# Patient Record
Sex: MALE | Race: BLACK OR AFRICAN AMERICAN
[De-identification: names, ages, dates, MRNs, and addresses within clinical notes are randomized per-mention and may not be internally consistent; named-entity substitution may affect disease eponyms.]

---

## 2017-04-20 ENCOUNTER — HOSPITAL ENCOUNTER (EMERGENCY)
Dept: HOSPITAL 10 - E/R | Age: 57
Discharge: LEFT BEFORE DISCHARGE | End: 2017-04-20
Payer: SELF-PAY

## 2017-04-20 VITALS
HEIGHT: 70 IN | HEIGHT: 70 IN | BODY MASS INDEX: 26.51 KG/M2 | WEIGHT: 185.19 LBS | WEIGHT: 185.19 LBS | BODY MASS INDEX: 26.51 KG/M2

## 2017-04-20 DIAGNOSIS — Z53.21: Primary | ICD-10-CM

## 2017-04-27 ENCOUNTER — HOSPITAL ENCOUNTER (INPATIENT)
Dept: HOSPITAL 10 - E/R | Age: 57
LOS: 3 days | Discharge: HOME | DRG: 872 | End: 2017-04-30
Attending: INTERNAL MEDICINE | Admitting: INTERNAL MEDICINE
Payer: MEDICAID

## 2017-04-27 VITALS
BODY MASS INDEX: 28.57 KG/M2 | HEIGHT: 68 IN | WEIGHT: 188.5 LBS | HEIGHT: 68 IN | WEIGHT: 188.5 LBS | BODY MASS INDEX: 28.57 KG/M2

## 2017-04-27 DIAGNOSIS — N45.2: ICD-10-CM

## 2017-04-27 DIAGNOSIS — B96.20: ICD-10-CM

## 2017-04-27 DIAGNOSIS — A41.9: Primary | ICD-10-CM

## 2017-04-27 DIAGNOSIS — K52.9: ICD-10-CM

## 2017-04-27 DIAGNOSIS — N39.0: ICD-10-CM

## 2017-04-27 DIAGNOSIS — B96.1: ICD-10-CM

## 2017-04-27 LAB
ADD SCAN DIFF: NO
ADD UMIC: YES
ALBUMIN SERPL-MCNC: 4 G/DL (ref 3.3–4.9)
ALBUMIN/GLOB SERPL: 1.29 {RATIO}
ALP SERPL-CCNC: 58 IU/L (ref 42–121)
ALT SERPL-CCNC: 37 IU/L (ref 13–69)
ANION GAP SERPL CALC-SCNC: 9 MMOL/L (ref 8–16)
APTT BLD: 24.7 SEC (ref 25–35)
AST SERPL-CCNC: 28 IU/L (ref 15–46)
BACTERIA #/AREA URNS HPF: (no result) /[HPF]
BASOPHILS # BLD AUTO: 0.1 10^3/UL (ref 0–0.1)
BASOPHILS NFR BLD: 0.3 % (ref 0–2)
BILIRUB DIRECT SERPL-MCNC: 0 MG/DL (ref 0–0.2)
BILIRUB SERPL-MCNC: 0.9 MG/DL (ref 0.2–1.3)
BUN SERPL-MCNC: 12 MG/DL (ref 7–20)
CALCIUM SERPL-MCNC: 9 MG/DL (ref 8.4–10.2)
CHLORIDE SERPL-SCNC: 108 MMOL/L (ref 97–110)
CO2 SERPL-SCNC: 23 MMOL/L (ref 21–31)
COLOR UR: (no result)
CREAT SERPL-MCNC: 0.69 MG/DL (ref 0.61–1.24)
EOSINOPHIL # BLD: 0.2 10^3/UL (ref 0–0.5)
EOSINOPHIL NFR BLD: 1.5 % (ref 0–7)
ERYTHROCYTE [DISTWIDTH] IN BLOOD BY AUTOMATED COUNT: 12.7 % (ref 11.5–14.5)
GLOBULIN SER-MCNC: 3.1 G/DL (ref 1.3–3.2)
GLUCOSE SERPL-MCNC: 93 MG/DL (ref 70–220)
GLUCOSE UR STRIP-MCNC: NEGATIVE %
HCT VFR BLD CALC: 43.9 % (ref 42–52)
HGB BLD-MCNC: 14 G/DL (ref 14–18)
INR PPP: 0.9
KETONES UR STRIP.AUTO-MCNC: (no result) MG/DL
LYMPHOCYTES # BLD AUTO: 1.5 10^3/UL (ref 0.8–2.9)
LYMPHOCYTES NFR BLD AUTO: 9.1 % (ref 15–51)
MCH RBC QN AUTO: 30.6 PG (ref 29–33)
MCHC RBC AUTO-ENTMCNC: 31.9 G/DL (ref 32–37)
MCV RBC AUTO: 96.1 FL (ref 82–101)
MONOCYTES # BLD: 0.9 10^3/UL (ref 0.3–0.9)
MONOCYTES NFR BLD: 5.8 % (ref 0–11)
NEUTROPHILS # BLD: 13.3 10^3/UL (ref 1.6–7.5)
NEUTROPHILS NFR BLD AUTO: 82.7 % (ref 39–77)
NITRITE UR QL STRIP.AUTO: NEGATIVE
NRBC # BLD MANUAL: 0 10^3/UL (ref 0–0)
NRBC BLD QL: 0 /100WBC (ref 0–0)
PLATELET # BLD: 191 10^3/UL (ref 140–415)
PMV BLD AUTO: 10.9 FL (ref 7.4–10.4)
POTASSIUM SERPL-SCNC: 4.3 MMOL/L (ref 3.5–5.1)
PROT SERPL-MCNC: 7.1 G/DL (ref 6.1–8.1)
PROTHROMBIN TIME: 12.1 SEC (ref 12.2–14.2)
PT RATIO: 0.9
RBC # BLD AUTO: 4.57 10^6/UL (ref 4.7–6.1)
RBC # UR AUTO: (no result) /UL
RBC #/AREA URNS HPF: (no result) /HPF
SODIUM SERPL-SCNC: 136 MMOL/L (ref 135–144)
SQUAMOUS #/AREA URNS HPF: (no result) /[HPF]
URINE BILIRUBIN (DIP): NEGATIVE
URINE TOTAL PROTEIN (DIP): NEGATIVE
UROBILINOGEN UR STRIP-ACNC: (no result) (ref 0.1–1)
WBC # BLD AUTO: 16.1 10^3/UL (ref 4.8–10.8)
WBC # UR STRIP: (no result) /UL

## 2017-04-27 PROCEDURE — 85025 COMPLETE CBC W/AUTO DIFF WBC: CPT

## 2017-04-27 PROCEDURE — 86780 TREPONEMA PALLIDUM: CPT

## 2017-04-27 PROCEDURE — 83735 ASSAY OF MAGNESIUM: CPT

## 2017-04-27 PROCEDURE — 87086 URINE CULTURE/COLONY COUNT: CPT

## 2017-04-27 PROCEDURE — 86592 SYPHILIS TEST NON-TREP QUAL: CPT

## 2017-04-27 PROCEDURE — 96375 TX/PRO/DX INJ NEW DRUG ADDON: CPT

## 2017-04-27 PROCEDURE — 83605 ASSAY OF LACTIC ACID: CPT

## 2017-04-27 PROCEDURE — 74176 CT ABD & PELVIS W/O CONTRAST: CPT

## 2017-04-27 PROCEDURE — 87040 BLOOD CULTURE FOR BACTERIA: CPT

## 2017-04-27 PROCEDURE — 87075 CULTR BACTERIA EXCEPT BLOOD: CPT

## 2017-04-27 PROCEDURE — 83690 ASSAY OF LIPASE: CPT

## 2017-04-27 PROCEDURE — 85610 PROTHROMBIN TIME: CPT

## 2017-04-27 PROCEDURE — 82270 OCCULT BLOOD FECES: CPT

## 2017-04-27 PROCEDURE — 96365 THER/PROPH/DIAG IV INF INIT: CPT

## 2017-04-27 PROCEDURE — 96366 THER/PROPH/DIAG IV INF ADDON: CPT

## 2017-04-27 PROCEDURE — 80053 COMPREHEN METABOLIC PANEL: CPT

## 2017-04-27 PROCEDURE — 96376 TX/PRO/DX INJ SAME DRUG ADON: CPT

## 2017-04-27 PROCEDURE — 81001 URINALYSIS AUTO W/SCOPE: CPT

## 2017-04-27 PROCEDURE — 81003 URINALYSIS AUTO W/O SCOPE: CPT

## 2017-04-27 PROCEDURE — 80048 BASIC METABOLIC PNL TOTAL CA: CPT

## 2017-04-27 PROCEDURE — 84100 ASSAY OF PHOSPHORUS: CPT

## 2017-04-27 PROCEDURE — 76870 US EXAM SCROTUM: CPT

## 2017-04-27 PROCEDURE — 87045 FECES CULTURE AEROBIC BACT: CPT

## 2017-04-27 PROCEDURE — 85730 THROMBOPLASTIN TIME PARTIAL: CPT

## 2017-04-27 PROCEDURE — 36415 COLL VENOUS BLD VENIPUNCTURE: CPT

## 2017-04-27 NOTE — ERA
ER Documentation


Chief Complaint


Date/Time


DATE: 4/27/17 


TIME: 18:16


Chief Complaint


LEFT SIDE ABDOMINAL AND GROIN PAIN





HPI


This 57-year-old male presents emergency room with severe left-sided abdominal 

pain and groin pain going down into his testicles.  He is also been feeling 

generalized weakness and malaise.  He denies any trauma.  He has had nausea 

without vomiting.  He denies diarrhea.





ROS


All systems reviewed and are negative except as per history of present illness.





Medications


Home Meds


Active Scripts


Ibuprofen* (Motrin*) 600 Mg Tab, 600 MG PO Q6, #30 TAB


   Prov:COURTNEY SANTOS MD         10/13/16


Discontinued Scripts


Acetaminophen with Codeine (Acetaminophen-Cod #3 Tablet) 1 Each Tablet, 1 TAB 

PO Q6H Y for PAIN, #12 TAB


   Prov:COURTNEY SANTOS MD         10/13/16


Cephalexin* (Keflex*) 500 Mg Capsule, 500 MG PO QID for 10 Days, CAP


   Prov:COURTNEY SANTOS MD         10/13/16





Allergies


Allergies:  


Coded Allergies:  


     No Known Allergy (Unverified , 4/27/17)





PMhx/Soc


Medical and Surgical Hx:  pt denies Medical Hx, pt denies Surgical Hx


History of Surgery:  No


Anesthesia Reaction:  No


Hx Neurological Disorder:  No


Hx Respiratory Disorders:  No


Hx Cardiac Disorders:  No


Hx Psychiatric Problems:  No


Hx Miscellaneous Medical Probl:  No


Hx Alcohol Use:  No


Hx Substance Use:  No


Hx Tobacco Use:  No


Smoking Status:  Never smoker





Physical Exam


Vitals





Vital Signs








  Date Time  Temp Pulse Resp B/P Pulse Ox O2 Delivery O2 Flow Rate FiO2


 


4/27/17 18:30 98.0 71 20 106/95 99 Room Air  


 


4/27/17 17:46  75 20 115/75 99 Room Air  


 


4/27/17 11:57 98.0 91 18 127/91 98   








Physical Exam


Const:  []            Moderate distress, holding abdomen, does not appear well


Head:   Atraumatic 


Eyes:    Normal Conjunctiva


ENT:    Normal External Ears, Nose and Mouth.


Neck:               Full range of motion..~ No meningismus.


Resp:    Clear to auscultation bilaterally


Cardio:    Regular rate and rhythm, no murmurs


Abd:    Soft, moderate abdominal tenderness to the mid abdomen as well as left 

mid and left lower quadrant.  Normal appearance of testicles without testicular 

tenderness, normal appearance of penis without tenderness.  non distended. 

Normal bowel sounds


Skin:    No petechiae or rashes


Back:    No midline or flank tenderness


Ext:    No cyanosis, or edema


Neur:    Awake and alert and oriented 3, no focal deficits


Psych:    Normal Mood and Affect


Result Diagram:  


4/27/17 1540                                                                   

             4/27/17 1540





Results 24 hrs





 Laboratory Tests








Test


  4/27/17


15:10 4/27/17


15:40 4/27/17


16:40


 


Urine Color LT. YELLOW   


 


Urine Clarity CLOUDY   


 


Urine pH 7.0   


 


Urine Specific Gravity 1.020   


 


Urine Ketones 3+   


 


Urine Nitrite NEGATIVE   


 


Urine Bilirubin NEGATIVE   


 


Urine Urobilinogen 0.2  E.U./dL   


 


Urine Leukocyte Esterase 2+   


 


Urine Microscopic RBC 0-2/HPF   


 


Urine Microscopic WBC >50/HPF   


 


Urine Squamous Epithelial


Cells FEW 


  


  


 


 


Urine Bacteria MANY   


 


Urine Hemoglobin TRACE   


 


Urine Glucose NEGATIVE%   


 


Urine Total Protein NEGATIVE   


 


White Blood Count  16.110^3/ul  


 


Red Blood Count  4.5710^6/ul  


 


Hemoglobin  14.0g/dl  


 


Hematocrit  43.9%  


 


Mean Corpuscular Volume  96.1fl  


 


Mean Corpuscular Hemoglobin  30.6pg  


 


Mean Corpuscular Hemoglobin


Concent 


  31.9g/dl 


  


 


 


Red Cell Distribution Width  12.7%  


 


Platelet Count  16243^3/UL  


 


Mean Platelet Volume  10.9fl  


 


Neutrophils %  82.7%  


 


Lymphocytes %  9.1%  


 


Monocytes %  5.8%  


 


Eosinophils %  1.5%  


 


Basophils %  0.3%  


 


Nucleated Red Blood Cells %  0.0/100WBC  


 


Neutrophils #  13.310^3/ul  


 


Lymphocytes #  1.510^3/ul  


 


Monocytes #  0.910^3/ul  


 


Eosinophils #  0.210^3/ul  


 


Basophils #  0.110^3/ul  


 


Nucleated Red Blood Cells #  0.010^3/ul  


 


Prothrombin Time  12.1Sec  


 


Prothrombin Time Ratio  0.9  


 


INR International Normalized


Ratio 


  0.90 


  


 


 


Activated Partial


Thromboplast Time 


  24.7Sec 


  


 


 


Sodium Level  136mmol/L  


 


Potassium Level  4.3mmol/L  


 


Chloride Level  108mmol/L  


 


Carbon Dioxide Level  23mmol/L  


 


Anion Gap  9  


 


Blood Urea Nitrogen  12mg/dl  


 


Creatinine  0.69mg/dl  


 


Glucose Level  93mg/dl  


 


Calcium Level  9.0mg/dl  


 


Total Bilirubin  0.9mg/dl  


 


Direct Bilirubin  0.00mg/dl  


 


Indirect Bilirubin  0.9mg/dl  


 


Aspartate Amino Transf


(AST/SGOT) 


  28IU/L 


  


 


 


Alanine Aminotransferase


(ALT/SGPT) 


  37IU/L 


  


 


 


Alkaline Phosphatase  58IU/L  


 


Total Protein  7.1g/dl  


 


Albumin  4.0g/dl  


 


Globulin  3.10g/dl  


 


Albumin/Globulin Ratio  1.29  


 


Lipase  32U/L  


 


Lactic Acid Level   0.8mmol/L 








 Current Medications








 Medications


  (Trade)  Dose


 Ordered  Sig/Grayson


 Route


 PRN Reason  Start Time


 Stop Time Status Last Admin


Dose Admin


 


 Sodium Chloride


  (NS)  1,000 ml @ 


 1,000 mls/hr  Q1H STAT


 IV


   4/27/17 14:40


 4/27/17 15:39 DC 4/27/17 15:46


 


 


 Morphine Sulfate


 2 mg  2 mg  ONCE  STAT


 IV


   4/27/17 14:40


 4/27/17 14:42 DC 4/27/17 15:46


 


 


 Ceftriaxone Sodium  50 ml @ 


 100 mls/hr  ONCE  STAT


 IVPB


   4/27/17 16:17


 4/27/17 16:46 DC 4/27/17 16:46


 


 


 Metronidazole


  (Flagyl 500 Mg


  (Pmx))  100 ml @ 


 100 mls/hr  ONCE  ONCE


 IVPB


   4/27/17 16:30


 4/27/17 17:29 DC 4/27/17 16:54


 


 


 Morphine Sulfate


  (morphine)  4 mg  ONCE  STAT


 IV


   4/27/17 16:20


 4/27/17 16:37 DC 4/27/17 16:55


 


 


 Azithromycin


  (Zithromax)  1,000 mg  ONCE  ONCE


 PO


   4/27/17 17:00


 4/27/17 17:01 DC 4/27/17 16:54


 


 


 Diclofenac Sodium


  (Dyloject)  37.5 mg  ONCE  STAT


 IV


   4/27/17 16:52


 4/27/17 16:53 DC 4/27/17 17:15


 


 


 Ondansetron HCl


  (Zofran Inj)  4 mg  BRIDGE ORDER PRN


 IV


 NAUSEA AND/OR VOMITING  4/27/17 18:00


 4/28/17 17:59   


 


 


 Acetaminophen


  (Tylenol Tab)  650 mg  ER BRIDGE PRN


 PO


 MILD PAIN/FEVER  4/27/17 18:00


 4/28/17 17:59   


 











Procedures/MDM


Patient with acute colitis and elevated white blood cell count as well as UTI.  

Leukocytosis may be secondary to UTI alone and may have inflammatory bowel 

disease.  No signs of sepsis because of stable vital signs.  However patient is 

ill-appearing.  In spite of pain medication he says that the pain is not 

changing.  He is able to sleep in the bed and appears sedated.  I am not going 

to give additional pain medication at this time these had 3 different doses of 

pain medication and because he is sleeping so comfortably I do not want to over 

sedate him.  It is unusual for a man to have a urinary tract infection and he 

has no history of any immunosuppression.  He definitely should be admitted for 

further workup as he is ill-appearing.  Did give him Rocephin and Flagyl and IV 

fluids to treat UTI and possible infectious colitis.  Blood cultures are 

pending.  Spoke with Dr. Barba will be admitting.





CT abdomen pelvis interpretation: Multiple areas of colon wall thickening, no 

obstruction, no free air, no acute fractures.





Departure


Diagnosis:  


 Primary Impression:  


 Acute colitis


Condition:  Stable











JOSHUA FINE DO Apr 27, 2017 18:18

## 2017-04-27 NOTE — RADRPT
PROCEDURE:   CT scan of the abdomen and pelvis without IV contrast.

 

CLINICAL INDICATION:   Abdominal pain.

 

TECHNIQUE:   Thin section axial, coronal and sagittal images were performed through the abdomen and 
pelvis without contrast. 

 

Radiation Dose: CTDI: 15.2 and DLP: 918.4

 

One or more of the following dose reduction techniques were used:

- Automated exposure control.

- Adjustment of the mA and/or kV according to patient size.

Use of iterative reconstruction technique.

 

COMPARISON:   Chest x-ray 02/03/2017 06:18 a.m. 

 

FINDINGS:

 

Soft tissues: There is bilateral symmetric gynecomastia..

 

Lungs and pleural spaces: Normal.  There is respiratory motion artifact blurring vascular detail.  N
o pleural effusion is identified.

 

Heart: Normal.  The heart is mildly enlarged.  No pericardial effusion is identified.

 

The liver, common bile duct and gallbladder: A 5.7 mm lesion which is likely a cyst is noted in the 
medial segment of the left lobe of the liver.  No additional workup is recommended.  The liver measu
res 16.5 cm AP.  The gallbladder and gallbladder wall are normal.

 

Gastrointestinal: The stomach is normal.  There is some mucosal thickening in small bowel loops in t
he left upper abdomen. The vermiform appendix is normal.

 

There is mucosal thickening in the hepatic flexure, transverse colon, splenic flexure and rectal amp
shari appear

 

Pancreas: Normal.

 

Kidneys, bladder and adrenal glands : Normal. 

 

Spleen: Normal.

 

Lymph nodes: Normal.  There is a left inguinal hernia which contains fat.

 

Reproductive system and pelvis : The prostate gland is upper limits of normal for size.  The seminal
 vesicles are normal.  No free fluid is noted in the pelvis.  There are degenerative changes of the 
lumbar spine and sacroiliac joints.  No acute bony fracture or bone metastasis is identified.

 

Bony elements: There is disk space narrowing with ventral spondylosis and vacuum disk phenomenon at 
L5-S1.  No bone metastasis or acute bony fracture is identified.  There is trace air in the epidural
 space dorsal to S1.

 

Vasculature: Atherosclerotic vascular calcifications are present in the proximal right common iliac 
artery and in the right left internal iliac arteries.

 

 

IMPRESSION:

 

1.  Mild cardiomegaly.

2.  Hepatomegaly. See C 5.7 mm lesion which is most likely a cyst in the medial segment of the left 
lobe of the liver.  No additional follow-up recommended at this time.

3.  Colitis involving portions of the transverse, descending and rectosigmoid colon.  Ulcerative col
itis could present this fashion.  Mucosal thickening of a small bowel loop in the left upper abdomen
 which may be the result of a small bowel enteritis or incomplete distension.  Exact etiology unknow
n.

4.  Left inguinal hernia containing fat.

5. Bilateral gynecomastia.

6.  Atherosclerotic vascular disease.

 

RPTAT:AAJJ

_____________________________________________ 

Physician Ian           Date    Time 

Electronically viewed and signed by Troy Montes, Physician on 04/27/2017 15:44 

 

D:  04/27/2017 15:44  T:  04/27/2017 15:44

YUNI/

## 2017-04-28 VITALS — HEART RATE: 87 BPM | SYSTOLIC BLOOD PRESSURE: 104 MMHG | DIASTOLIC BLOOD PRESSURE: 57 MMHG | RESPIRATION RATE: 16 BRPM

## 2017-04-28 VITALS — HEART RATE: 89 BPM | TEMPERATURE: 98.5 F

## 2017-04-28 VITALS — SYSTOLIC BLOOD PRESSURE: 120 MMHG | DIASTOLIC BLOOD PRESSURE: 81 MMHG | RESPIRATION RATE: 18 BRPM

## 2017-04-28 VITALS — SYSTOLIC BLOOD PRESSURE: 116 MMHG | RESPIRATION RATE: 18 BRPM | DIASTOLIC BLOOD PRESSURE: 69 MMHG

## 2017-04-28 LAB
ABNORMAL IP MESSAGE: 1
ADD SCAN DIFF: NO
ALBUMIN SERPL-MCNC: 3.5 G/DL (ref 3.3–4.9)
ALBUMIN/GLOB SERPL: 1.12 {RATIO}
ALP SERPL-CCNC: 43 IU/L (ref 42–121)
ALT SERPL-CCNC: 30 IU/L (ref 13–69)
ANION GAP SERPL CALC-SCNC: 13 MMOL/L (ref 8–16)
AST SERPL-CCNC: 20 IU/L (ref 15–46)
BASOPHILS # BLD AUTO: 0 10^3/UL (ref 0–0.1)
BASOPHILS NFR BLD: 0.1 % (ref 0–2)
BILIRUB DIRECT SERPL-MCNC: 0 MG/DL (ref 0–0.2)
BILIRUB SERPL-MCNC: 1.2 MG/DL (ref 0.2–1.3)
BUN SERPL-MCNC: 11 MG/DL (ref 7–20)
CALCIUM SERPL-MCNC: 8.7 MG/DL (ref 8.4–10.2)
CHLORIDE SERPL-SCNC: 102 MMOL/L (ref 97–110)
CO2 SERPL-SCNC: 25 MMOL/L (ref 21–31)
CREAT SERPL-MCNC: 0.8 MG/DL (ref 0.61–1.24)
EOSINOPHIL # BLD: 0 10^3/UL (ref 0–0.5)
EOSINOPHIL NFR BLD: 0 % (ref 0–7)
ERYTHROCYTE [DISTWIDTH] IN BLOOD BY AUTOMATED COUNT: 13 % (ref 11.5–14.5)
GLOBULIN SER-MCNC: 3.1 G/DL (ref 1.3–3.2)
GLUCOSE SERPL-MCNC: 126 MG/DL (ref 70–220)
HCT VFR BLD CALC: 38.6 % (ref 42–52)
HGB BLD-MCNC: 12.2 G/DL (ref 14–18)
LYMPHOCYTES # BLD AUTO: 1.5 10^3/UL (ref 0.8–2.9)
LYMPHOCYTES NFR BLD AUTO: 6.2 % (ref 15–51)
MAGNESIUM SERPL-MCNC: 1.8 MG/DL (ref 1.7–2.5)
MCH RBC QN AUTO: 29.8 PG (ref 29–33)
MCHC RBC AUTO-ENTMCNC: 31.6 G/DL (ref 32–37)
MCV RBC AUTO: 94.4 FL (ref 82–101)
MONOCYTES # BLD: 1 10^3/UL (ref 0.3–0.9)
MONOCYTES NFR BLD: 4.1 % (ref 0–11)
NEUTROPHILS # BLD: 21.6 10^3/UL (ref 1.6–7.5)
NEUTROPHILS NFR BLD AUTO: 87.9 % (ref 39–77)
NRBC # BLD MANUAL: 0 10^3/UL (ref 0–0)
NRBC BLD QL: 0 /100WBC (ref 0–0)
PHOSPHATE SERPL-MCNC: 3.1 MG/DL (ref 2.5–4.9)
PLATELET # BLD: 181 10^3/UL (ref 140–415)
PMV BLD AUTO: 10.9 FL (ref 7.4–10.4)
POTASSIUM SERPL-SCNC: 3.7 MMOL/L (ref 3.5–5.1)
PROT SERPL-MCNC: 6.6 G/DL (ref 6.1–8.1)
RBC # BLD AUTO: 4.09 10^6/UL (ref 4.7–6.1)
SODIUM SERPL-SCNC: 136 MMOL/L (ref 135–144)
WBC # BLD AUTO: 24.6 10^3/UL (ref 4.8–10.8)

## 2017-04-28 RX ADMIN — FOLIC ACID SCH MLS/HR: 5 INJECTION, SOLUTION INTRAMUSCULAR; INTRAVENOUS; SUBCUTANEOUS at 12:03

## 2017-04-28 RX ADMIN — FOLIC ACID SCH MLS/HR: 5 INJECTION, SOLUTION INTRAMUSCULAR; INTRAVENOUS; SUBCUTANEOUS at 03:33

## 2017-04-28 RX ADMIN — Medication SCH MLS/HR: at 03:36

## 2017-04-28 RX ADMIN — PIPERACILLIN SODIUM AND TAZOBACTAM SODIUM SCH MLS/HR: 3; .375 INJECTION, POWDER, LYOPHILIZED, FOR SOLUTION INTRAVENOUS at 21:34

## 2017-04-28 RX ADMIN — FOLIC ACID SCH MLS/HR: 5 INJECTION, SOLUTION INTRAMUSCULAR; INTRAVENOUS; SUBCUTANEOUS at 22:03

## 2017-04-28 RX ADMIN — DIPHENHYDRAMINE HYDROCHLORIDE SCH MG: 50 INJECTION, SOLUTION INTRAMUSCULAR; INTRAVENOUS at 21:33

## 2017-04-28 RX ADMIN — Medication SCH MLS/HR: at 14:00

## 2017-04-28 RX ADMIN — Medication SCH MLS/HR: at 23:25

## 2017-04-28 RX ADMIN — DIPHENHYDRAMINE HYDROCHLORIDE SCH MG: 50 INJECTION, SOLUTION INTRAMUSCULAR; INTRAVENOUS at 08:12

## 2017-04-28 NOTE — HP
Date/Time of Note


Date/Time of Note


DATE: 4/28/17 


TIME: 06:29





Assessment/Plan


VTE Prophylaxis


VTE Prophylaxis Intervention:  SCD's





Lines/Catheters


IV Catheter Type (from Nrsg):  Peripheral IV





Assessment/Plan


Assessment/Plan





1. Sepsis as evidenced by leukocytosis and tachycardia 2/2 Colitis


- Keep NPO for now


- cont abx


- f/u culture results, including C-diff


- GI consult


- pain mgmt





2. Colitis: infectious vs UC


- cont abx


- f/u culture results, including C-diff


- GI consult





3. UTI


- cont abx


- f/u culture results





4. left Epididymo-Orchitis


- cont abx





HPI/ROS


Admit Date/Time


Admit Date/Time








Hx of Present Illness





This is a 56 yo male with no significant medical hx who presented to ER c/o abd 

pain. pain is mainly localized in LLQ area with radiation to his testicles. 

Denied similar pain in the past. Denied associated diarrhea, constipation, N/V 

or fever/chills. CT a/p showed Colitis involving portions of the transverse, 

descending and rectosigmoid colon.  Ulcerative colitis could present this 

fashion.  Mucosal thickening of a small bowel loop in the left upper abdomen 

which may be the result of a small bowel enteritis or incomplete distension. 

Testicular u/s showed left epididymo-orchitis, small bilateral hydroceles and 

left varicocele. vitals stable. labs showed WBC of 16,000 otherwise, CBC and 

CMP within acceptable range. UA consistent with UTI.


.


.





PMH/Family/Social


Past Medical History


Medical History:  no pertinent history





Past Surgical History


Past Surgical Hx:  no surgical history





Social History


Alcohol Use:  none


Smoking Status:  Never smoker


Drug Use:  none





Exam/Review of Systems


Vital Signs


Vitals





 Vital Signs








  Date Time  Temp Pulse Resp B/P Pulse Ox O2 Delivery O2 Flow Rate FiO2


 


4/28/17 05:30 100.8 87 16 104/57  Room Air  


 


4/28/17 02:00     99   











Exam


Constitutional:  alert, oriented, well developed


Head:  atraumatic, normocephalic


Eyes:  EOMI, PERRL


Neck:  non-tender, supple


Respiratory:  clear to auscultation, normal air movement


Cardiovascular:  nl pulses, regular rate and rhythm


Gastrointestinal:  other (LLQ tenderness), soft, tender


Extremities:  normal pulses





Labs


Result Diagram:  


4/27/17 1540                                                                   

             4/27/17 1540








Medications


Medications





 Current Medications


Dextrose/Sodium Chloride (D5-1/2ns) 1,000 ml @  100 mls/hr Q10H IV  Last 

administered on 4/28/17at 03:33; Admin Dose 100 MLS/HR;  Start 4/28/17 at 02:03


Ondansetron HCl (Zofran Inj) 4 mg Q6H  PRN IV NAUSEA AND/OR VOMITING;  Start 4/ 28/17 at 02:30


Morphine Sulfate (morphine) 3 mg Q4H  PRN IV pain;  Start 4/28/17 at 02:30


Famotidine 20 mg 20 mg Q12 IV ;  Start 4/28/17 at 09:00


Ciprofloxacin/ Dextrose 200 ml @  200 mls/hr Q12 IVPB ;  Start 4/28/17 at 09:00


Metronidazole (Flagyl 500 Mg (Pmx)) 100 ml @  100 mls/hr Q8 IVPB  Last 

administered on 4/28/17at 03:36; Admin Dose 100 MLS/HR;  Start 4/28/17 at 02:30











TENNILLE MELENDEZ MD Apr 28, 2017 06:39

## 2017-04-28 NOTE — CONS
Date/Time of Note


Date/Time of Note


DATE: 4/28/17 


TIME: 18:15





Assessment/Plan


Assessment/Plan


Additional Assessment/Plan


LLQ abdominal pain


Evaluate infectious process versus inflammatory bowel disease versus other 

etiology 


Continue antibiotic treatment


May change diet to NPO based on CT results


Review C. difficile results


Review CT with oral contrast


Rule out absess, recommend Surgical consult if positive


Colonoscopy as inpatient if clinically indicated





Testicular pain


May be secondary to UTI


May benefit from urology consult


Receiving antibiotic treatment








Further recommendations depend on clinical course


Patient seen in collaboration with Dr. Schneider





Consultation Date/Type/Reason


Admit Date/Time





Type of Consultation:  Gastroenterology





Hx of Present Illness


Mr. Jaylan Laws is a 61 is a 57-year-old male presented with worsening 

abdominal pain 1 week.  Patient states abdominal pain and lower left quadrant 

with radiation to testicles.  Patient did note loose stool but denies watery 

diarrhea.  Patient denies previous episode.  Patient states pain worse with 

movement.  Patient denies family history of colon cancer and inflammatory bowel 

disease.  Patient denies travel outside the [place], sick contacts, nausea, vomiting

, fever, chills, and new medications.  Patient does not have significant past 

medical history and has never had colonoscopy.





Past Medical History


Medical History:  no pertinent history





Past Surgical History


Past Surgical Hx:  no surgical history





Social History


Alcohol Use:  none


Smoking Status:  Current some day smoker


Drug Use:  none





Exam/Review of Systems


Vital Signs


Vitals





 Vital Signs








  Date Time  Temp Pulse Resp B/P Pulse Ox O2 Delivery O2 Flow Rate FiO2


 


4/28/17 17:29 98.7 80 18 116/69 98   


 


4/28/17 14:42      Room Air  











Exam


Constitutional:  alert, oriented, well developed


Psych:  nl mood/affect


Head:  normocephalic


Eyes:  EOMI, nl conjunctiva, nl lids


ENMT:  nl external ears & nose, nl lips & teeth, nl nasal mucosa & septum


Respiratory:  clear to auscultation, normal air movement


Cardiovascular:  regular rate and rhythm


Gastrointestinal:  soft, left lower quadrant tender


Musculoskeletal:  nl extremities to inspection


Neurological:  CNS II-XII intact





Results


Result Diagram:  


4/28/17 0603                                                                   

             4/28/17 0603





Results 24 hrs





Laboratory Tests








Test


  4/28/17


06:03


 


White Blood Count 24.6  #H


 


Red Blood Count 4.09  L


 


Hemoglobin 12.2  L


 


Hematocrit 38.6  L


 


Mean Corpuscular Volume 94.4  


 


Mean Corpuscular Hemoglobin 29.8  


 


Mean Corpuscular Hemoglobin


Concent 31.6  L


 


 


Red Cell Distribution Width 13.0  


 


Platelet Count 181  


 


Mean Platelet Volume 10.9  H


 


Neutrophils % 87.9  H


 


Lymphocytes % 6.2  L


 


Monocytes % 4.1  


 


Eosinophils % 0.0  


 


Basophils % 0.1  


 


Nucleated Red Blood Cells % 0.0  


 


Neutrophils # 21.6  H


 


Lymphocytes # 1.5  


 


Monocytes # 1.0  H


 


Eosinophils # 0.0  


 


Basophils # 0.0  


 


Nucleated Red Blood Cells # 0.0  


 


Sodium Level 136  


 


Potassium Level 3.7  


 


Chloride Level 102  


 


Carbon Dioxide Level 25  


 


Anion Gap 13  


 


Blood Urea Nitrogen 11  


 


Creatinine 0.80  


 


Glucose Level 126  


 


Calcium Level 8.7  


 


Phosphorus Level 3.1  


 


Magnesium Level 1.8  


 


Total Bilirubin 1.2  


 


Direct Bilirubin 0.00  


 


Indirect Bilirubin 1.2  H


 


Aspartate Amino Transf


(AST/SGOT) 20  


 


 


Alanine Aminotransferase


(ALT/SGPT) 30  


 


 


Alkaline Phosphatase 43  


 


Total Protein 6.6  


 


Albumin 3.5  


 


Globulin 3.10  


 


Albumin/Globulin Ratio 1.12  











Medications


Medications





 Current Medications


Dextrose/Sodium Chloride (D5-1/2ns) 1,000 ml @  100 mls/hr Q10H IV  Last 

administered on 4/28/17at 03:33; Admin Dose 100 MLS/HR;  Start 4/28/17 at 02:03


Ondansetron HCl (Zofran Inj) 4 mg Q6H  PRN IV NAUSEA AND/OR VOMITING;  Start 4/ 28/17 at 02:30


Morphine Sulfate (morphine) 3 mg Q4H  PRN IV pain;  Start 4/28/17 at 02:30


Famotidine 20 mg 20 mg Q12 IV  Last administered on 4/28/17at 08:12; Admin Dose 

20 MG;  Start 4/28/17 at 09:00


Metronidazole 100 ml @  100 mls/hr Q8 IVPB  Last administered on 4/28/17at 03:36

; Admin Dose 100 MLS/HR;  Start 4/28/17 at 02:30


Piperacillin Sod/ Tazobactam Sod (Zosyn 3.375gm/ 100 ml (Pmx)) 100 ml @  200 mls

/hr Q8 IVPB ;  Start 4/28/17 at 22:00











ROBERT NAGEL Apr 28, 2017 18:24

## 2017-04-28 NOTE — CONS
DATE OF ADMISSION: 04/28/2017

DATE OF CONSULTATION:  04/28/2017

 

 

 

TYPE OF CONSULTATION:  Infectious Disease.

 

REASON FOR CONSULTATION:  Antibiotic management.

 

HISTORY OF PRESENT ILLNESS:  The patient is a 57-year-old male with no significant past medical hist
ory who came to the emergency room with abdominal pain localizing in the left lower quadrant with ra
diation to the testicles.  He denied any similar episodes in the past.  Denied associated diarrhea, 
constipation.  CT scan showed colitis involving portions of the transverse, descending, and rectosig
moid colon Ulcerative colitis could also present in this fashion.  Mucosal thickening of the small b
owel in the left upper abdomen.  Testicular ultrasound showed left epididymal orchitis, small bilate
ral hydroceles, and left varicocele.  Vitals stable.  Labs showed white count of 16,000 on CBC, and 
CMP within normal range.  On admission while in the emergency room, his white count was 16.1 and now
 24.6, H and H 12.6 and 38.6, platelet count 181,000.  BUN and creatinine 11/0.8.  His urine is 2+ l
eukocyte esterase, greater than 50 white cells per high-power field, cloudy urine clarity.  An abdom
inopelvic CT showed as noted cardiomegaly, hepatomegaly, colitis, left inguinal hernia, bilateral gy
necomastia, atherosclerotic vascular disease.

 

PAST MEDICAL HISTORY:  Operations as outlined.

 

FAMILY HISTORY:  Noncontributory.

 

SOCIAL HISTORY:  Does not smoke, drink or abuse drugs.

 

ALLERGIES:  NONE TO PENICILLIN, SULFA OR FOODS.

 

MEDICATIONS:  Per chart.

 

REVIEW OF SYSTEMS:  As per HPI.

 

PHYSICAL EXAMINATION:

GENERAL:  The patient is a well-developed, well-nourished male.

VITAL SIGNS:  Temperature on admission of 100.8.

SKIN:  Without generalized rash.

HEENT:  Within normal limits.

NECK:  Supple.

LYMPH NODES:  None palpable.

CHEST:  Decreased breath sounds at the bases.

HEART:  Without murmur or gallop.

ABDOMEN:  Soft.  Slightly tender without rebound, without organosplenomegaly or masses.

EXTREMITIES:  Without cyanosis, clubbing, or edema.  

GENITAL/RECTAL:  The patient has normal appearance of testicles without testicular tenderness.  Exam
 is deferred otherwise.

NEUROLOGICAL:  Within normal limits.

 

IMPRESSION AND PLAN:  The patient presents with a variety of problems including sepsis as evidenced 
by leukocytosis, tachycardia, probably secondary to colitis as well as to epididymal orchitis.  He p
robably as a urinary tract infection as well with 2+ leukocyte esterase and greater than 50 white ce
lls per high-power field.  The patient was started on ciprofloxacin and Flagyl.  He received azithro
mycin.  I am going to switch him to Zosyn.  Discontinue the Cipro.  I will dictate my findings to [facility] hospitalist.

 

 

Dictated By: JERROLD DREYER MD JD/KRISTEN

DD:    04/28/2017 15:55:31

DT:    04/28/2017 16:17:21

Conf#: [id number]

DID#:  [id number]

## 2017-04-29 VITALS — RESPIRATION RATE: 19 BRPM | DIASTOLIC BLOOD PRESSURE: 71 MMHG | SYSTOLIC BLOOD PRESSURE: 114 MMHG

## 2017-04-29 VITALS — RESPIRATION RATE: 20 BRPM | SYSTOLIC BLOOD PRESSURE: 101 MMHG | DIASTOLIC BLOOD PRESSURE: 68 MMHG

## 2017-04-29 LAB
ADD SCAN DIFF: NO
ANION GAP SERPL CALC-SCNC: 9 MMOL/L (ref 8–16)
BASOPHILS # BLD AUTO: 0 10^3/UL (ref 0–0.1)
BASOPHILS NFR BLD: 0.3 % (ref 0–2)
BUN SERPL-MCNC: 10 MG/DL (ref 7–20)
CALCIUM SERPL-MCNC: 8.7 MG/DL (ref 8.4–10.2)
CHLORIDE SERPL-SCNC: 106 MMOL/L (ref 97–110)
CO2 SERPL-SCNC: 25 MMOL/L (ref 21–31)
CREAT SERPL-MCNC: 0.82 MG/DL (ref 0.61–1.24)
EOSINOPHIL # BLD: 0.1 10^3/UL (ref 0–0.5)
EOSINOPHIL NFR BLD: 0.9 % (ref 0–7)
ERYTHROCYTE [DISTWIDTH] IN BLOOD BY AUTOMATED COUNT: 13 % (ref 11.5–14.5)
GLUCOSE SERPL-MCNC: 130 MG/DL (ref 70–220)
HCT VFR BLD CALC: 38.3 % (ref 42–52)
HGB BLD-MCNC: 11.9 G/DL (ref 14–18)
LYMPHOCYTES # BLD AUTO: 1.1 10^3/UL (ref 0.8–2.9)
LYMPHOCYTES NFR BLD AUTO: 7.4 % (ref 15–51)
MAGNESIUM SERPL-MCNC: 2 MG/DL (ref 1.7–2.5)
MCH RBC QN AUTO: 29.4 PG (ref 29–33)
MCHC RBC AUTO-ENTMCNC: 31.1 G/DL (ref 32–37)
MCV RBC AUTO: 94.6 FL (ref 82–101)
MONOCYTES # BLD: 0.7 10^3/UL (ref 0.3–0.9)
MONOCYTES NFR BLD: 4.6 % (ref 0–11)
NEUTROPHILS # BLD: 13 10^3/UL (ref 1.6–7.5)
NEUTROPHILS NFR BLD AUTO: 86.2 % (ref 39–77)
NRBC # BLD MANUAL: 0 10^3/UL (ref 0–0)
NRBC BLD QL: 0 /100WBC (ref 0–0)
PHOSPHATE SERPL-MCNC: 3 MG/DL (ref 2.5–4.9)
PLATELET # BLD: 177 10^3/UL (ref 140–415)
PMV BLD AUTO: 11.3 FL (ref 7.4–10.4)
POTASSIUM SERPL-SCNC: 3.7 MMOL/L (ref 3.5–5.1)
RBC # BLD AUTO: 4.05 10^6/UL (ref 4.7–6.1)
SODIUM SERPL-SCNC: 136 MMOL/L (ref 135–144)
WBC # BLD AUTO: 15.1 10^3/UL (ref 4.8–10.8)

## 2017-04-29 RX ADMIN — PIPERACILLIN SODIUM AND TAZOBACTAM SODIUM SCH MLS/HR: 3; .375 INJECTION, POWDER, LYOPHILIZED, FOR SOLUTION INTRAVENOUS at 14:41

## 2017-04-29 RX ADMIN — Medication SCH MLS/HR: at 23:37

## 2017-04-29 RX ADMIN — FOLIC ACID SCH MLS/HR: 5 INJECTION, SOLUTION INTRAMUSCULAR; INTRAVENOUS; SUBCUTANEOUS at 18:01

## 2017-04-29 RX ADMIN — PIPERACILLIN SODIUM AND TAZOBACTAM SODIUM SCH MLS/HR: 3; .375 INJECTION, POWDER, LYOPHILIZED, FOR SOLUTION INTRAVENOUS at 21:06

## 2017-04-29 RX ADMIN — Medication SCH MLS/HR: at 13:32

## 2017-04-29 RX ADMIN — FOLIC ACID SCH MLS/HR: 5 INJECTION, SOLUTION INTRAMUSCULAR; INTRAVENOUS; SUBCUTANEOUS at 08:03

## 2017-04-29 RX ADMIN — DIPHENHYDRAMINE HYDROCHLORIDE SCH MG: 50 INJECTION, SOLUTION INTRAMUSCULAR; INTRAVENOUS at 21:06

## 2017-04-29 RX ADMIN — Medication SCH MLS/HR: at 06:44

## 2017-04-29 RX ADMIN — FOLIC ACID SCH MLS/HR: 5 INJECTION, SOLUTION INTRAMUSCULAR; INTRAVENOUS; SUBCUTANEOUS at 03:40

## 2017-04-29 RX ADMIN — DIPHENHYDRAMINE HYDROCHLORIDE SCH MG: 50 INJECTION, SOLUTION INTRAMUSCULAR; INTRAVENOUS at 08:07

## 2017-04-29 RX ADMIN — PIPERACILLIN SODIUM AND TAZOBACTAM SODIUM SCH MLS/HR: 3; .375 INJECTION, POWDER, LYOPHILIZED, FOR SOLUTION INTRAVENOUS at 05:38

## 2017-04-29 NOTE — PN
Date/Time of Note


Date/Time of Note


DATE: 4/29/17 


TIME: 17:23





Assessment/Plan


VTE Prophylaxis


VTE Prophylaxis Intervention:  SCD's





Lines/Catheters


[facility]  Peripheral IV


Urinary Cath still in place:  No





Assessment/Plan


Chief Complaint/Hosp Course


1. Sepsis as evidenced by leukocytosis and tachycardia 2/2 Colitis and/or UTI 

and/or orchitis


Continue antibiotics Zosyn and Flagyl


ID and GI consult appreciated





2. Colitis: infectious vs UC


cont abx


f/u culture results, including C-diff


GI consult appreciated





3. UTI


- cont abx


- f/u culture results





Prophylaxis: SCDs


Problems:  





Subjective


24 Hr Interval Summary


Constitutional:  no complaints





Exam/Review of Systems


Vital Signs


Vitals





 Vital Signs








  Date Time  Temp Pulse Resp B/P Pulse Ox O2 Delivery O2 Flow Rate FiO2


 


4/29/17 08:00 98.2 65 19 114/71 99   


 


4/28/17 14:42      Room Air  














 Intake and Output   


 


 4/28/17 4/28/17 4/29/17





 15:00 23:00 07:00


 


Intake Total  600 ml 1350 ml


 


Balance  600 ml 1350 ml











Exam


Constitutional:  alert


Respiratory:  clear to auscultation


Cardiovascular:  regular rate and rhythm


Gastrointestinal:  soft, 


   No distended


Musculoskeletal:  nl extremities to inspection





Results


Result Diagram:  


4/29/17 0446                                                                   

             4/29/17 0446





Results 24 hrs





Laboratory Tests








Test


  4/29/17


04:46


 


White Blood Count 15.1  #H


 


Red Blood Count 4.05  L


 


Hemoglobin 11.9  L


 


Hematocrit 38.3  L


 


Mean Corpuscular Volume 94.6  


 


Mean Corpuscular Hemoglobin 29.4  


 


Mean Corpuscular Hemoglobin


Concent 31.1  L


 


 


Red Cell Distribution Width 13.0  


 


Platelet Count 177  


 


Mean Platelet Volume 11.3  H


 


Neutrophils % 86.2  H


 


Lymphocytes % 7.4  L


 


Monocytes % 4.6  


 


Eosinophils % 0.9  


 


Basophils % 0.3  


 


Nucleated Red Blood Cells % 0.0  


 


Neutrophils # 13.0  H


 


Lymphocytes # 1.1  


 


Monocytes # 0.7  


 


Eosinophils # 0.1  


 


Basophils # 0.0  


 


Nucleated Red Blood Cells # 0.0  


 


Sodium Level 136  


 


Potassium Level 3.7  


 


Chloride Level 106  


 


Carbon Dioxide Level 25  


 


Anion Gap 9  


 


Blood Urea Nitrogen 10  


 


Creatinine 0.82  


 


Glucose Level 130  


 


Calcium Level 8.7  


 


Phosphorus Level 3.0  


 


Magnesium Level 2.0  











Medications


Medications





 Current Medications


Dextrose/Sodium Chloride (D5-1/2ns) 1,000 ml @  100 mls/hr Q10H IV  Last 

administered on 4/29/17at 03:40; Admin Dose 100 MLS/HR;  Start 4/28/17 at 02:03


Ondansetron HCl (Zofran Inj) 4 mg Q6H  PRN IV NAUSEA AND/OR VOMITING;  Start 4/ 28/17 at 02:30


Morphine Sulfate (morphine) 3 mg Q4H  PRN IV pain Last administered on 4/29/ 17at 03:55; Admin Dose 3 MG;  Start 4/28/17 at 02:30


Famotidine 20 mg 20 mg Q12 IV  Last administered on 4/29/17at 08:07; Admin Dose 

20 MG;  Start 4/28/17 at 09:00


Metronidazole 100 ml @  100 mls/hr Q8 IVPB  Last administered on 4/29/17at 13:32

; Admin Dose 100 MLS/HR;  Start 4/28/17 at 02:30


Piperacillin Sod/ Tazobactam Sod (Zosyn 3.375gm/ 100 ml (Pmx)) 100 ml @  200 mls

/hr Q8 IVPB  Last administered on 4/29/17at 14:41; Admin Dose 200 MLS/HR;  

Start 4/28/17 at 22:00











MAURICIO ABIG Apr 29, 2017 17:25

## 2017-04-29 NOTE — CONS
Date/Time of Note


Date/Time of Note


DATE: 4/29/17 


TIME: 09:58





Assessment/Plan


Assessment/Plan


Chief Complaint/Hosp Course


Mr. Jaylan Laws is a 61 is a 57-year-old male presented with worsening 

abdominal pain 1 week.  Patient states abdominal pain and lower left quadrant 

with radiation to testicles.  Patient did note loose stool but denies watery 

diarrhea.  Patient denies previous episode.  Patient states pain worse with 

movement.  Patient denies family history of colon cancer and inflammatory bowel 

disease.  Patient denies travel outside the [place], sick contacts, nausea, vomiting

, fever, chills, and new medications.  Patient does not have significant past 

medical history and has never had colonoscopy.


Problems:  


Additional Assessment/Plan


LLQ abdominal pain


Evaluate infectious process versus inflammatory bowel disease versus other 

etiology 


Continue antibiotic treatment


May change diet to NPO based on CT results


Review C. difficile results


Review CT with oral contrast


Rule out absess, recommend Surgical consult if positive


Colonoscopy as inpatient if clinically indicated





Testicular pain


May be secondary to UTI


May benefit from urology consult


Receiving antibiotic treatment








Further recommendations depend on clinical course


Patient seen in collaboration with Dr. Schneider





Consultation Date/Type/Reason


Admit Date/Time


Apr 28, 2017 at 15:59


Initial Consult Date





Type of Consultation:  Gastroenterology





24 HR Interval Summary


Free Text/Dictation


Denies abdominal pain


CT abdomen in process





Exam/Review of Systems


Vital Signs


Vitals





 Vital Signs








  Date Time  Temp Pulse Resp B/P Pulse Ox O2 Delivery O2 Flow Rate FiO2


 


4/29/17 08:00 98.2 65 19 114/71 99   


 


4/28/17 14:42      Room Air  














 Intake and Output   


 


 4/28/17 4/28/17 4/29/17





 15:00 23:00 07:00


 


Intake Total  600 ml 1350 ml


 


Balance  600 ml 1350 ml











Exam


Constitutional:  alert, oriented, well developed


Psych:  nl mood/affect


Head:  normocephalic


Eyes:  EOMI, nl conjunctiva, nl lids


ENMT:  nl external ears & nose, nl lips & teeth, nl nasal mucosa & septum


Respiratory:  clear to auscultation, normal air movement


Cardiovascular:  regular rate and rhythm


Gastrointestinal:  soft, left lower quadrant tender


Musculoskeletal:  nl extremities to inspection


Neurological:  CNS II-XII intact





Results


Result Diagram:  


4/29/17 0446                                                                   

             4/29/17 0446





Results 24 hrs





Laboratory Tests








Test


  4/29/17


04:46


 


White Blood Count 15.1  #H


 


Red Blood Count 4.05  L


 


Hemoglobin 11.9  L


 


Hematocrit 38.3  L


 


Mean Corpuscular Volume 94.6  


 


Mean Corpuscular Hemoglobin 29.4  


 


Mean Corpuscular Hemoglobin


Concent 31.1  L


 


 


Red Cell Distribution Width 13.0  


 


Platelet Count 177  


 


Mean Platelet Volume 11.3  H


 


Neutrophils % 86.2  H


 


Lymphocytes % 7.4  L


 


Monocytes % 4.6  


 


Eosinophils % 0.9  


 


Basophils % 0.3  


 


Nucleated Red Blood Cells % 0.0  


 


Neutrophils # 13.0  H


 


Lymphocytes # 1.1  


 


Monocytes # 0.7  


 


Eosinophils # 0.1  


 


Basophils # 0.0  


 


Nucleated Red Blood Cells # 0.0  


 


Sodium Level 136  


 


Potassium Level 3.7  


 


Chloride Level 106  


 


Carbon Dioxide Level 25  


 


Anion Gap 9  


 


Blood Urea Nitrogen 10  


 


Creatinine 0.82  


 


Glucose Level 130  


 


Calcium Level 8.7  


 


Phosphorus Level 3.0  


 


Magnesium Level 2.0  











Medications


Medications





 Current Medications


Dextrose/Sodium Chloride (D5-1/2ns) 1,000 ml @  100 mls/hr Q10H IV  Last 

administered on 4/29/17at 03:40; Admin Dose 100 MLS/HR;  Start 4/28/17 at 02:03


Ondansetron HCl (Zofran Inj) 4 mg Q6H  PRN IV NAUSEA AND/OR VOMITING;  Start 4/ 28/17 at 02:30


Morphine Sulfate (morphine) 3 mg Q4H  PRN IV pain Last administered on 4/29/ 17at 03:55; Admin Dose 3 MG;  Start 4/28/17 at 02:30


Famotidine 20 mg 20 mg Q12 IV  Last administered on 4/29/17at 08:07; Admin Dose 

20 MG;  Start 4/28/17 at 09:00


Metronidazole 100 ml @  100 mls/hr Q8 IVPB  Last administered on 4/29/17at 06:44

; Admin Dose 100 MLS/HR;  Start 4/28/17 at 02:30


Piperacillin Sod/ Tazobactam Sod (Zosyn 3.375gm/ 100 ml (Pmx)) 100 ml @  200 mls

/hr Q8 IVPB  Last administered on 4/29/17at 05:38; Admin Dose 200 MLS/HR;  

Start 4/28/17 at 22:00











ROBERT NAGEL Apr 29, 2017 10:01

## 2017-04-29 NOTE — RADRPT
PROCEDURE:   CT Abdomen and Pelvis without contrast. 

 

CLINICAL INDICATION:   Abdominal and pelvic pain. Left lower quadrant pain.  

 

TECHNIQUE:   CT scan of the abdomen and pelvis without contrast was performed. Coronal and sagittal 
reformatted images were obtained from the axial source images. Images were reviewed on a high-resolu
[employer] PACS workstation. Total exam DLP is 866.04 mGy-cm.  CTDIvol is 13.77 mGy.  One or more of the f
ollowing dose reduction techniques were used: Automated exposure control, adjustment of the mA and/o
r kV according to patient size, use of iterative reconstruction technique.

 

COMPARISON:   CT scan of the abdomen and pelvis dated 04/27/2017 

 

FINDINGS:

The lung bases are normal.  There is no pleural effusion. 

The liver is normal in size and attenuation.  There is no focal hepatic lesion. 

The gallbladder and bile ducts are normal. 

The spleen is normal in size.  There is no focal splenic lesion. 

Both adrenals are normal with no enlargement or mass. 

The pancreas is unremarkable with no mass or evidence of pancreatitis. 

There is no renal mass or hydronephrosis.  There is no renal calculus or ureteral calculus. 

The abdominal aorta is not dilated. Vascular calcifications are present consistent with atherosclero
sis. 

There is no retroperitoneal lymphadenopathy or mass. 

There is no pelvic lymphadenopathy or mass. 

The bladder and distal ureters are normal. 

The periappendiceal region is unremarkable with no evidence of appendicitis. 

The bowel and mesentery are normal.  There is no bowel wall thickening. 

There is no free fluid or free gas. 

There is vacuum disk phenomenon at L5-S1.  The osseous structures are otherwise unremarkable.  

 

IMPRESSION:

1.  Atherosclerosis.

2.  Normal appearance of the bowel with previously noted thickening of the wall of the colon no long
er present.

2.  Degenerative changes at L5-S1.

3.  Otherwise unremarkable CT scan of the abdomen and pelvis.  

 

RPTAT: QQ

_____________________________________________ 

.Shakeel Martinez MD, MD           Date    Time 

Electronically viewed and signed by .Shakeel Martinez MD, MD on 04/29/2017 20:01 

 

D:  04/29/2017 20:01  T:  04/29/2017 20:01

.R/

## 2017-04-29 NOTE — CONS
Date/Time of Note


Date/Time of Note


DATE: 4/29/17 


TIME: 21:43





Assessment/Plan


Assessment/Plan


Chief Complaint/Hosp Course


ID PROGRESS NOTE


TOTAL ABX DAY #2 => Zosyn #2 +  Flagyl #2


s/p Ceftriaxone + Azith in ED 4/28


24H INTERVAL SUMMARY 


* Feeling much better tonight - He is afebrile, WBC improving and left groin/

scrotal/testicular pain is RESOLVED. 


* 4/27 urine cx (+) URINE CULTURE  Preliminary  


        Organism 1                     GRAM NEGATIVE OSCAR


           COLONY COUNT                >100,000 CFU/ml


        Organism 2                     GRAM NEGATIVE OSCAR#2


           COLONY COUNT                >100,000 CFU/ml


* CT ABD on admission:  Colitis involving portions of the transverse, 

descending and rectosigmoid colon.  Ulcerative colitis could present this 

fashion.  Mucosal thickening of a small bowel loop in the left upper abdomen 

which may be the result of a small bowel enteritis or incomplete distension.  

Exact etiology unknown.


   CT 4/29IMPRESSION:


      1.  Atherosclerosis.


      2.  Normal appearance of the bowel with previously noted thickening of 

the wall of the colon no longer present.


      2.  Degenerative changes at L5-S1.


      3.  Otherwise unremarkable CT scan of the abdomen and pelvis.  





PHYSICAL EXAMINATION:


GENERAL: Profound weakness, debility, cachexia, frail, 


HEENT: Temporal wasting


NECK:  Supple, trach midline 


CHEST:  Equal chest rise bilaterally, without dyspnea on observation 


EXTREMITIES:  Moves all extremities, scattered ecchymosis, intentional tremors 





ID ASSESSMENT:


58 yo M admitted with: 


1.  Sepsis w/lactic acidosis 2.5, Tmax 100.1, tachycardia = RESOLVING w/ABX 


2.  Acute complicated polymicrobial GNR UTI w/1.  Left epididymo-orchitis.  2.  

Small bilateral hydroceles.   3.  Left varicocele.


* 4/27 urine cx (+) URINE CULTURE  Preliminary  


        Organism 1          GRAM NEGATIVE OSCAR


                COLONY COUNT                >100,000 CFU/ml


         Organism 2          GRAM NEGATIVE OSCAR#2


                 COLONY COUNT                >100,000 CFU/ml


3.  Prostate gland is upper limits of normal for size


4.  Left inguinal hernia which contains fat.


5. Colitis involving portions of the transverse, descending and rectosigmoid 

colon. 


* CT Ulcerative colitis could present this fashion.  Mucosal thickening of a 

small bowel loop in the left upper abdomen which may be the result of a small 

bowel enteritis or incomplete distension.  Exact etiology unknown.


INVASIVES: * PIV


ABX ALLERGIES: KNDA


CURRENT ABX:  TOTAL ABX DAY #2 => Zosyn #2 +  Flagyl #2


s/p Ceftriaxone + Azith in ED 4/28


ID RECOMMENDATIONS: 


1. Started on  appropriate ABX with resolution of colitis, resolving sepsis, 

pain resolved.


2. Bedrest, ICE may be applied to testicles PRN, Scrotal elevation, NSAIDs, 

avoid MORPHINE 


Patient is ; denies hx of GC/Chlamydia/Syphilis/HIV => Give me 

permission to check STD; however tells me "I don't need to check for HIV, I don'

t have risk for that"


3. I gave patient a Medical Disability Notification that he is admitted to [facility] 

for 1)SIRS, 2)Acute colitis, 3) Complicated polymicrobial UTI -- he requested 

this so his wife can fax to his employer tomorrow. He works in a [employer].


4. Will follow up tomorrow on GNR pathogens ID C&S pending





.








Problems:  





Consultation Date/Type/Reason


Admit Date/Time


Apr 28, 2017 at 15:59


Initial Consult Date





Type of Consultation:  ID





Exam/Review of Systems


Vital Signs


Vitals





 Vital Signs








  Date Time  Temp Pulse Resp B/P Pulse Ox O2 Delivery O2 Flow Rate FiO2


 


4/29/17 20:16 97.7 64 20 101/68 100   


 


4/28/17 14:42      Room Air  














 Intake and Output   


 


 4/28/17 4/28/17 4/29/17





 15:00 23:00 07:00


 


Intake Total  600 ml 1350 ml


 


Balance  600 ml 1350 ml











Results


Result Diagram:  


4/29/17 0446                                                                   

             4/29/17 0446





Results 24 hrs





Laboratory Tests








Test


  4/29/17


04:46


 


White Blood Count 15.1  #H


 


Red Blood Count 4.05  L


 


Hemoglobin 11.9  L


 


Hematocrit 38.3  L


 


Mean Corpuscular Volume 94.6  


 


Mean Corpuscular Hemoglobin 29.4  


 


Mean Corpuscular Hemoglobin


Concent 31.1  L


 


 


Red Cell Distribution Width 13.0  


 


Platelet Count 177  


 


Mean Platelet Volume 11.3  H


 


Neutrophils % 86.2  H


 


Lymphocytes % 7.4  L


 


Monocytes % 4.6  


 


Eosinophils % 0.9  


 


Basophils % 0.3  


 


Nucleated Red Blood Cells % 0.0  


 


Neutrophils # 13.0  H


 


Lymphocytes # 1.1  


 


Monocytes # 0.7  


 


Eosinophils # 0.1  


 


Basophils # 0.0  


 


Nucleated Red Blood Cells # 0.0  


 


Sodium Level 136  


 


Potassium Level 3.7  


 


Chloride Level 106  


 


Carbon Dioxide Level 25  


 


Anion Gap 9  


 


Blood Urea Nitrogen 10  


 


Creatinine 0.82  


 


Glucose Level 130  


 


Calcium Level 8.7  


 


Phosphorus Level 3.0  


 


Magnesium Level 2.0  











Medications


Medications





 Current Medications


Dextrose/Sodium Chloride (D5-1/2ns) 1,000 ml @  100 mls/hr Q10H IV  Last 

administered on 4/29/17at 18:01; Admin Dose 100 MLS/HR;  Start 4/28/17 at 02:03


Ondansetron HCl (Zofran Inj) 4 mg Q6H  PRN IV NAUSEA AND/OR VOMITING;  Start 4/ 28/17 at 02:30


Morphine Sulfate (morphine) 3 mg Q4H  PRN IV pain Last administered on 4/29/ 17at 19:52; Admin Dose 3 MG;  Start 4/28/17 at 02:30


Famotidine 20 mg 20 mg Q12 IV  Last administered on 4/29/17at 21:06; Admin Dose 

20 MG;  Start 4/28/17 at 09:00


Metronidazole 100 ml @  100 mls/hr Q8 IVPB  Last administered on 4/29/17at 13:32

; Admin Dose 100 MLS/HR;  Start 4/28/17 at 02:30


Piperacillin Sod/ Tazobactam Sod (Zosyn 3.375gm/ 100 ml (Pmx)) 100 ml @  200 mls

/hr Q8 IVPB  Last administered on 4/29/17at 21:06; Admin Dose 200 MLS/HR;  

Start 4/28/17 at 22:00











KIMBERLY SULLIVAN NP Apr 29, 2017 21:54

## 2017-04-30 VITALS — DIASTOLIC BLOOD PRESSURE: 71 MMHG | SYSTOLIC BLOOD PRESSURE: 103 MMHG | RESPIRATION RATE: 19 BRPM

## 2017-04-30 LAB
ADD SCAN DIFF: NO
ANION GAP SERPL CALC-SCNC: 12 MMOL/L (ref 8–16)
BASOPHILS # BLD AUTO: 0 10^3/UL (ref 0–0.1)
BASOPHILS NFR BLD: 0.2 % (ref 0–2)
BUN SERPL-MCNC: 9 MG/DL (ref 7–20)
CALCIUM SERPL-MCNC: 8.5 MG/DL (ref 8.4–10.2)
CHLORIDE SERPL-SCNC: 104 MMOL/L (ref 97–110)
CO2 SERPL-SCNC: 27 MMOL/L (ref 21–31)
CREAT SERPL-MCNC: 0.76 MG/DL (ref 0.61–1.24)
EOSINOPHIL # BLD: 0.3 10^3/UL (ref 0–0.5)
EOSINOPHIL NFR BLD: 3.4 % (ref 0–7)
ERYTHROCYTE [DISTWIDTH] IN BLOOD BY AUTOMATED COUNT: 13 % (ref 11.5–14.5)
GLUCOSE SERPL-MCNC: 120 MG/DL (ref 70–220)
HCT VFR BLD CALC: 36.2 % (ref 42–52)
HGB BLD-MCNC: 11.3 G/DL (ref 14–18)
LYMPHOCYTES # BLD AUTO: 1.4 10^3/UL (ref 0.8–2.9)
LYMPHOCYTES NFR BLD AUTO: 15 % (ref 15–51)
MCH RBC QN AUTO: 29.7 PG (ref 29–33)
MCHC RBC AUTO-ENTMCNC: 31.2 G/DL (ref 32–37)
MCV RBC AUTO: 95.3 FL (ref 82–101)
MONOCYTES # BLD: 0.5 10^3/UL (ref 0.3–0.9)
MONOCYTES NFR BLD: 5.7 % (ref 0–11)
NEUTROPHILS # BLD: 7 10^3/UL (ref 1.6–7.5)
NEUTROPHILS NFR BLD AUTO: 75.3 % (ref 39–77)
NRBC # BLD MANUAL: 0 10^3/UL (ref 0–0)
NRBC BLD QL: 0 /100WBC (ref 0–0)
PLATELET # BLD: 171 10^3/UL (ref 140–415)
PMV BLD AUTO: 11.5 FL (ref 7.4–10.4)
POTASSIUM SERPL-SCNC: 3.5 MMOL/L (ref 3.5–5.1)
RBC # BLD AUTO: 3.8 10^6/UL (ref 4.7–6.1)
SODIUM SERPL-SCNC: 139 MMOL/L (ref 135–144)
WBC # BLD AUTO: 9.2 10^3/UL (ref 4.8–10.8)

## 2017-04-30 RX ADMIN — PIPERACILLIN SODIUM AND TAZOBACTAM SODIUM SCH MLS/HR: 3; .375 INJECTION, POWDER, LYOPHILIZED, FOR SOLUTION INTRAVENOUS at 14:00

## 2017-04-30 RX ADMIN — DIPHENHYDRAMINE HYDROCHLORIDE SCH MG: 50 INJECTION, SOLUTION INTRAMUSCULAR; INTRAVENOUS at 08:45

## 2017-04-30 RX ADMIN — FOLIC ACID SCH MLS/HR: 5 INJECTION, SOLUTION INTRAMUSCULAR; INTRAVENOUS; SUBCUTANEOUS at 04:09

## 2017-04-30 RX ADMIN — Medication SCH MLS/HR: at 14:00

## 2017-04-30 RX ADMIN — FOLIC ACID SCH MLS/HR: 5 INJECTION, SOLUTION INTRAMUSCULAR; INTRAVENOUS; SUBCUTANEOUS at 14:03

## 2017-04-30 RX ADMIN — PIPERACILLIN SODIUM AND TAZOBACTAM SODIUM SCH MLS/HR: 3; .375 INJECTION, POWDER, LYOPHILIZED, FOR SOLUTION INTRAVENOUS at 05:25

## 2017-04-30 RX ADMIN — Medication SCH MLS/HR: at 06:28

## 2017-04-30 NOTE — DS
DATE OF ADMISSION: 04/28/2017

DATE OF DISCHARGE: 04/30/2017

 

DISCHARGE DIAGNOSES:

1.  Sepsis secondary to urinary tract infection, possible orchitis, possible colitis, now resolved. 
 The patient is asymptomatic.

2.  Colitis.  The patient cleared for discharge per GI.

3.  Urinary tract infection, status post antibiotics.

 

HOSPITAL COURSE:  The patient is a 57-year-old male with no significant medical history.  The patien
t presents with sepsis with scrotal pain as well as signs of colitis.  The patient had a testicular 
ultrasound that showed left epididymal orchitis, small bilateral hydroceles, left ____.  CT abdomen 
and pelvis was done twice without contrast.  The second one showed normal appearance of bowel, previ
ously noted thickening of the wall of the colon no longer present, degenerative changes at L5-S1, ot
herwise unremarkable CT of abdomen and pelvis.  The patient's testicular swelling had resolved.  The
 patient's signs of sepsis had also resolved.  His cultures showed negative Clostridium difficile, b
lood cultures were negative, and urine showed E. coli and klebsiella.  The patient did receive antib
iotics during hospitalization.  On the day of discharge, the patient's vitals, labs, and physical ex
am were stable.  He had no acute complaints and questions were answered.

 

CONDITION ON DISCHARGE:  Stable.

 

DISPOSITION:  To home.

 

MEDICATIONS:  The patient to continue his usual home medications.  No new medications were prescribe
d.

 

FOLLOWUP:  The patient is to follow up with his PCP in 1 to 2 weeks.

 

Greater than 30 minutes was spent in coordinating the discharge of patient.

 

 

Dictated By: MAURICIO NARVAEZ/KRISTEN

DD:    04/30/2017 18:11:44

DT:    04/30/2017 18:23:22

Conf#: [id number]

DID#:  [id number]

## 2017-04-30 NOTE — PDOCDIS
Discharge Instructions


CONDITION


Patient Condition:  Good





HOME CARE INSTRUCTIONS:


Diet Instructions:  Regular





ACTIVITY:








Activity Restrictions:   No Restrictions











FOLLOW UP/APPOINTMENTS


Appointments


F/U WITH YOUR PCP IN 1-2 WEEKS











MAURICIO BAIG Apr 30, 2017 14:37

## 2017-04-30 NOTE — CONS
Date/Time of Note


Date/Time of Note


DATE: 4/30/17 


TIME: 13:17





Assessment/Plan


Assessment/Plan


Chief Complaint/Hosp Course


Mr. Jaylan Laws is a 61 is a 57-year-old male presented with worsening 

abdominal pain 1 week.  Patient states abdominal pain and lower left quadrant 

with radiation to testicles.  Patient did note loose stool but denies watery 

diarrhea.  Patient denies previous episode.  Patient states pain worse with 

movement.  Patient denies family history of colon cancer and inflammatory bowel 

disease.  Patient denies travel outside the [place], sick contacts, nausea, vomiting

, fever, chills, and new medications.  Patient does not have significant past 

medical history and has never had colonoscopy.


Problems:  


Additional Assessment/Plan


LLQ abdominal pain


Resolved


Continue antibiotic treatment


C. difficile, neg


CT with oral contrast, neg


Colonoscopy as inpatient not clinically indicated


Stable from GI standpoint





Testicular pain


Resolved


May benefit from urology consult


Receiving antibiotic treatment





Further recommendations depend on clinical course


Patient seen in collaboration with Dr. Schneider





Consultation Date/Type/Reason


Admit Date/Time


Apr 28, 2017 at 15:59


Type of Consultation:  GI





24 HR Interval Summary


Free Text/Dictation


Shen diet


Denies abdominal pain and diarrhea


CT abdomen normal


C diff neg, stool culture neg


OK for DC from GI standpoint





Exam/Review of Systems


Vital Signs


Vitals





 Vital Signs








  Date Time  Temp Pulse Resp B/P Pulse Ox O2 Delivery O2 Flow Rate FiO2


 


4/30/17 08:00 98.3 57 19 103/71 98   


 


4/28/17 14:42      Room Air  














 Intake and Output   


 


 4/29/17 4/29/17 4/30/17





 15:00 23:00 07:00


 


Intake Total 100 ml 1910 ml 1600 ml


 


Balance 100 ml 1910 ml 1600 ml











Exam


Constitutional:  alert, oriented, well developed


Psych:  nl mood/affect


Head:  normocephalic


Eyes:  EOMI, nl conjunctiva, nl lids


ENMT:  nl external ears & nose, nl lips & teeth, nl nasal mucosa & septum


Respiratory:  clear to auscultation, normal air movement


Cardiovascular:  regular rate and rhythm


Gastrointestinal:  soft, non tender


Musculoskeletal:  nl extremities to inspection


Neurological:  CNS II-XII intact





Results


Result Diagram:  


4/30/17 0520                                                                   

             4/30/17 0520





Results 24 hrs





Laboratory Tests








Test


  4/29/17


15:47 4/30/17


05:20


 


Stool Occult Blood NEGATIVE   


 


White Blood Count  9.2  #


 


Red Blood Count  3.80  L


 


Hemoglobin  11.3  L


 


Hematocrit  36.2  L


 


Mean Corpuscular Volume  95.3  


 


Mean Corpuscular Hemoglobin  29.7  


 


Mean Corpuscular Hemoglobin


Concent 


  31.2  L


 


 


Red Cell Distribution Width  13.0  


 


Platelet Count  171  


 


Mean Platelet Volume  11.5  H


 


Neutrophils %  75.3  


 


Lymphocytes %  15.0  


 


Monocytes %  5.7  


 


Eosinophils %  3.4  


 


Basophils %  0.2  


 


Nucleated Red Blood Cells %  0.0  


 


Neutrophils #  7.0  


 


Lymphocytes #  1.4  


 


Monocytes #  0.5  


 


Eosinophils #  0.3  


 


Basophils #  0.0  


 


Nucleated Red Blood Cells #  0.0  


 


Sodium Level  139  


 


Potassium Level  3.5  


 


Chloride Level  104  


 


Carbon Dioxide Level  27  


 


Anion Gap  12  


 


Blood Urea Nitrogen  9  


 


Creatinine  0.76  


 


Glucose Level  120  


 


Calcium Level  8.5  











Medications


Medications





 Current Medications


Dextrose/Sodium Chloride (D5-1/2ns) 1,000 ml @  100 mls/hr Q10H IV  Last 

administered on 4/30/17at 04:09; Admin Dose 100 MLS/HR;  Start 4/28/17 at 02:03


Ondansetron HCl (Zofran Inj) 4 mg Q6H  PRN IV NAUSEA AND/OR VOMITING;  Start 4/ 28/17 at 02:30


Famotidine 20 mg 20 mg Q12 IV  Last administered on 4/30/17at 08:45; Admin Dose 

20 MG;  Start 4/28/17 at 09:00


Metronidazole 100 ml @  100 mls/hr Q8 IVPB  Last administered on 4/30/17at 06:28

; Admin Dose 100 MLS/HR;  Start 4/28/17 at 02:30


Piperacillin Sod/ Tazobactam Sod (Zosyn 3.375gm/ 100 ml (Pmx)) 100 ml @  200 mls

/hr Q8 IVPB  Last administered on 4/30/17at 05:25; Admin Dose 200 MLS/HR;  

Start 4/28/17 at 22:00


Tramadol HCl (Ultram) 50 mg Q6H  PRN GTB PAIN;  Start 4/29/17 at 22:30











ROBERT NAGEL Apr 30, 2017 13:22

## 2017-04-30 NOTE — CONS
Date/Time of Note


Date/Time of Note


DATE: 4/30/17 


TIME: 15:50





Assessment/Plan


Assessment/Plan


Chief Complaint/Hosp Course


ID PROGRESS NOTE


TOTAL ABX DAY #3 => Zosyn #3 +  Flagyl #3


s/p Ceftriaxone + Azith in ED 4/28


24H INTERVAL SUMMARY 


* Symptoms have completed resolved, no fevers, no ABD pain, Feeling much better 

tonight - He is afebrile, WBC improving and left groin/scrotal/testicular pain 

is RESOLVED. 


*  URINE CULTURE  Final  


     Organism 1                     ESCHERICHIA COLI


        COLONY COUNT                >100,000 CFU/ml


     Organism 2                     K.PNEUMONIAE SSP PNEUMONIAE


        COLONY COUNT                >100,000 CFU/ml





                                    E COLI           K PNE SPP        


                                    M.I.C.    RX     M.I.C.    RX     


                                    --------- ---    --------- ---    


     AMPICILLIN                     <=2        S                      


     CEFAZOLIN                                 S                S     


     CEFOTAXIME                                S                S     


     CIPROFLOXACIN                  <=0.25     S     <=0.25     S     


     GENTAMICIN                     <=1        S     <=1        S     


     LEVOFLOXACIN                   <=0.12     S     <=0.12     S     


     NITROFURANTOIN                 <=16       S     64         I     


     TOBRAMYCIN                     <=1        S     <=1        S     


     TRIMETHOPRIM/SULFAMETHOXAZOLE  <=20       S     <=20       S     





---------------------------------------------------------------------


* CT ABD on admission:  Colitis involving portions of the transverse, 

descending and rectosigmoid colon.  Ulcerative colitis could present this 

fashion.  Mucosal thickening of a small bowel loop in the left upper abdomen 

which may be the result of a small bowel enteritis or incomplete distension.  

Exact etiology unknown.


   CT 4/29IMPRESSION:


      1.  Atherosclerosis.


      2.  Normal appearance of the bowel with previously noted thickening of 

the wall of the colon no longer present.


      2.  Degenerative changes at L5-S1.


      3.  Otherwise unremarkable CT scan of the abdomen and pelvis.  





PHYSICAL EXAMINATION:


GENERAL: Profound weakness, debility, cachexia, frail, 


HEENT: Temporal wasting


NECK:  Supple, trach midline 


CHEST:  Equal chest rise bilaterally, without dyspnea on observation 


EXTREMITIES:  Moves all extremities, scattered ecchymosis, intentional tremors 





ID ASSESSMENT:


58 yo M admitted with: 


1.  Sepsis w/lactic acidosis 2.5, Tmax 100.1, tachycardia = RESOLVING w/ABX 


2.  Acute complicated polymicrobial GNR UTI w/1.  Left epididymo-orchitis.  2.  

Small bilateral hydroceles.   3.  Left varicocele.


* 4/27 urine cx (+) URINE CULTURE  Preliminary  


     Organism 1                     ESCHERICHIA COLI


        COLONY COUNT                >100,000 CFU/ml


     Organism 2                     K.PNEUMONIAE SSP PNEUMONIAE


        COLONY COUNT                >100,000 CFU/ml





                                    E COLI           K PNE SPP        


                                    M.I.C.    RX     M.I.C.    RX     


                                    --------- ---    --------- ---    


     AMPICILLIN                     <=2        S                      


     CEFAZOLIN                                 S                S     


     CEFOTAXIME                                S                S     


     CIPROFLOXACIN                  <=0.25     S     <=0.25     S     


     GENTAMICIN                     <=1        S     <=1        S     


     LEVOFLOXACIN                   <=0.12     S     <=0.12     S     


     NITROFURANTOIN                 <=16       S     64         I     


     TOBRAMYCIN                     <=1        S     <=1        S     


     TRIMETHOPRIM/SULFAMETHOXAZOLE  <=20       S     <=20       S     





---------------------------------------------------------------------


3.  Prostate gland is upper limits of normal for size


4.  Left inguinal hernia which contains fat.


5. Colitis involving portions of the transverse, descending and rectosigmoid 

colon. 


* CT Ulcerative colitis could present this fashion.  Mucosal thickening of a 

small bowel loop in the left upper abdomen which may be the result of a small 

bowel enteritis or incomplete distension.  Exact etiology unknown.


INVASIVES: * PIV


ABX ALLERGIES: KNDA


CURRENT ABX:  TOTAL ABX DAY #3 => Zosyn #3 +  Flagyl #3


s/p Ceftriaxone + Azith in ED 4/28


ID RECOMMENDATIONS: 


1. DC planning in process


2. Patient may DC home on Levaquin 500mg po daily x total 10 days -- Rx 

provided.


3. Patient may return to work full-duty on 5/4/17, without restrictions note 

provided.


* Patient expresses agreement, understands to follow up with outpatient 

provider after discharge. 


.








Problems:  





Consultation Date/Type/Reason


Admit Date/Time


Apr 28, 2017 at 15:59


Type of Consultation:  ID





Exam/Review of Systems


Vital Signs


Vitals





 Vital Signs








  Date Time  Temp Pulse Resp B/P Pulse Ox O2 Delivery O2 Flow Rate FiO2


 


4/30/17 08:00 98.3 57 19 103/71 98   


 


4/28/17 14:42      Room Air  














 Intake and Output   


 


 4/29/17 4/29/17 4/30/17





 15:00 23:00 07:00


 


Intake Total 100 ml 1910 ml 1600 ml


 


Balance 100 ml 1910 ml 1600 ml











Results


Result Diagram:  


4/30/17 0520                                                                   

             4/30/17 0520





Results 24 hrs





Laboratory Tests








Test


  4/30/17


05:20


 


White Blood Count 9.2  #


 


Red Blood Count 3.80  L


 


Hemoglobin 11.3  L


 


Hematocrit 36.2  L


 


Mean Corpuscular Volume 95.3  


 


Mean Corpuscular Hemoglobin 29.7  


 


Mean Corpuscular Hemoglobin


Concent 31.2  L


 


 


Red Cell Distribution Width 13.0  


 


Platelet Count 171  


 


Mean Platelet Volume 11.5  H


 


Neutrophils % 75.3  


 


Lymphocytes % 15.0  


 


Monocytes % 5.7  


 


Eosinophils % 3.4  


 


Basophils % 0.2  


 


Nucleated Red Blood Cells % 0.0  


 


Neutrophils # 7.0  


 


Lymphocytes # 1.4  


 


Monocytes # 0.5  


 


Eosinophils # 0.3  


 


Basophils # 0.0  


 


Nucleated Red Blood Cells # 0.0  


 


Sodium Level 139  


 


Potassium Level 3.5  


 


Chloride Level 104  


 


Carbon Dioxide Level 27  


 


Anion Gap 12  


 


Blood Urea Nitrogen 9  


 


Creatinine 0.76  


 


Glucose Level 120  


 


Calcium Level 8.5  


 


Rapid Plasma Reagin NONREACTIVE  











Medications


Medications





 Current Medications


Dextrose/Sodium Chloride (D5-1/2ns) 1,000 ml @  100 mls/hr Q10H IV  Last 

administered on 4/30/17at 04:09; Admin Dose 100 MLS/HR;  Start 4/28/17 at 02:03


Ondansetron HCl (Zofran Inj) 4 mg Q6H  PRN IV NAUSEA AND/OR VOMITING;  Start 4/ 28/17 at 02:30


Famotidine 20 mg 20 mg Q12 IV  Last administered on 4/30/17at 08:45; Admin Dose 

20 MG;  Start 4/28/17 at 09:00


Metronidazole 100 ml @  100 mls/hr Q8 IVPB  Last administered on 4/30/17at 06:28

; Admin Dose 100 MLS/HR;  Start 4/28/17 at 02:30


Piperacillin Sod/ Tazobactam Sod (Zosyn 3.375gm/ 100 ml (Pmx)) 100 ml @  200 mls

/hr Q8 IVPB  Last administered on 4/30/17at 05:25; Admin Dose 200 MLS/HR;  

Start 4/28/17 at 22:00


Tramadol HCl (Ultram) 50 mg Q6H  PRN GTB PAIN;  Start 4/29/17 at 22:30











KIMBERLY SULLIVAN NP Apr 30, 2017 16:02

## 2018-01-06 ENCOUNTER — HOSPITAL ENCOUNTER (EMERGENCY)
Dept: HOSPITAL 91 - E/R | Age: 58
Discharge: HOME | End: 2018-01-06
Payer: MEDICAID

## 2018-01-06 ENCOUNTER — HOSPITAL ENCOUNTER (EMERGENCY)
Age: 58
Discharge: HOME | End: 2018-01-06

## 2018-01-06 DIAGNOSIS — Z87.891: ICD-10-CM

## 2018-01-06 DIAGNOSIS — J20.9: Primary | ICD-10-CM

## 2018-01-06 LAB
ADD MAN DIFF?: NO
ANION GAP: 15 (ref 8–16)
BASOPHIL #: 0 10^3/UL (ref 0–0.1)
BASOPHILS %: 0.3 % (ref 0–2)
BLOOD UREA NITROGEN: 13 MG/DL (ref 7–20)
CALCIUM: 8.6 MG/DL (ref 8.4–10.2)
CARBON DIOXIDE: 26 MMOL/L (ref 21–31)
CHLORIDE: 105 MMOL/L (ref 97–110)
CREATININE: 0.78 MG/DL (ref 0.61–1.24)
EOSINOPHILS #: 0.3 10^3/UL (ref 0–0.5)
EOSINOPHILS %: 3.1 % (ref 0–7)
GLUCOSE: 150 MG/DL (ref 70–220)
HEMATOCRIT: 38.8 % (ref 42–52)
HEMOGLOBIN: 12.3 G/DL (ref 14–18)
LYMPHOCYTES #: 1.5 10^3/UL (ref 0.8–2.9)
LYMPHOCYTES %: 17.6 % (ref 15–51)
MEAN CORPUSCULAR HEMOGLOBIN: 30.8 PG (ref 29–33)
MEAN CORPUSCULAR HGB CONC: 31.7 G/DL (ref 32–37)
MEAN CORPUSCULAR VOLUME: 97 FL (ref 82–101)
MEAN PLATELET VOLUME: 11.1 FL (ref 7.4–10.4)
MONOCYTE #: 0.9 10^3/UL (ref 0.3–0.9)
MONOCYTES %: 9.8 % (ref 0–11)
NEUTROPHIL #: 5.9 10^3/UL (ref 1.6–7.5)
NEUTROPHILS %: 69 % (ref 39–77)
NUCLEATED RED BLOOD CELLS #: 0 10^3/UL (ref 0–0)
NUCLEATED RED BLOOD CELLS%: 0 /100WBC (ref 0–0)
PLATELET COUNT: 177 10^3/UL (ref 140–415)
POTASSIUM: 4 MMOL/L (ref 3.5–5.1)
RED BLOOD COUNT: 4 10^6/UL (ref 4.7–6.1)
RED CELL DISTRIBUTION WIDTH: 13.1 % (ref 11.5–14.5)
SODIUM: 142 MMOL/L (ref 135–144)
TROPONIN-I: < 0.012 NG/ML (ref 0–0.12)
URINE PH (DIP) POC: 6.5 (ref 5–8.5)
URINE TOTAL PROTEIN POC: (no result)
WHITE BLOOD COUNT: 8.6 10^3/UL (ref 4.8–10.8)

## 2018-01-06 PROCEDURE — 36415 COLL VENOUS BLD VENIPUNCTURE: CPT

## 2018-01-06 PROCEDURE — 71045 X-RAY EXAM CHEST 1 VIEW: CPT

## 2018-01-06 PROCEDURE — 93005 ELECTROCARDIOGRAM TRACING: CPT

## 2018-01-06 PROCEDURE — 99285 EMERGENCY DEPT VISIT HI MDM: CPT

## 2018-01-06 PROCEDURE — 85025 COMPLETE CBC W/AUTO DIFF WBC: CPT

## 2018-01-06 PROCEDURE — 96375 TX/PRO/DX INJ NEW DRUG ADDON: CPT

## 2018-01-06 PROCEDURE — 80048 BASIC METABOLIC PNL TOTAL CA: CPT

## 2018-01-06 PROCEDURE — 81003 URINALYSIS AUTO W/O SCOPE: CPT

## 2018-01-06 PROCEDURE — 84484 ASSAY OF TROPONIN QUANT: CPT

## 2018-01-06 PROCEDURE — 96374 THER/PROPH/DIAG INJ IV PUSH: CPT

## 2018-01-06 RX ADMIN — ONDANSETRON HYDROCHLORIDE 1 MG: 2 INJECTION, SOLUTION INTRAMUSCULAR; INTRAVENOUS at 06:57

## 2018-01-06 RX ADMIN — ASPIRIN 325 MG ORAL TABLET 1 MG: 325 PILL ORAL at 05:07

## 2018-01-06 RX ADMIN — KETOROLAC TROMETHAMINE 1 MG: 30 INJECTION, SOLUTION INTRAMUSCULAR at 06:57
